# Patient Record
Sex: FEMALE | Race: WHITE | Employment: STUDENT | URBAN - METROPOLITAN AREA
[De-identification: names, ages, dates, MRNs, and addresses within clinical notes are randomized per-mention and may not be internally consistent; named-entity substitution may affect disease eponyms.]

---

## 2019-01-04 ENCOUNTER — HOSPITAL ENCOUNTER (EMERGENCY)
Age: 19
Discharge: HOME | End: 2019-01-04
Attending: STUDENT IN AN ORGANIZED HEALTH CARE EDUCATION/TRAINING PROGRAM
Payer: OTHER GOVERNMENT

## 2019-01-04 VITALS
TEMPERATURE: 98.6 F | HEIGHT: 64 IN | SYSTOLIC BLOOD PRESSURE: 125 MMHG | RESPIRATION RATE: 12 BRPM | OXYGEN SATURATION: 97 % | HEART RATE: 71 BPM | DIASTOLIC BLOOD PRESSURE: 73 MMHG | WEIGHT: 109 LBS | BODY MASS INDEX: 18.61 KG/M2

## 2019-01-04 DIAGNOSIS — H60.392 CHRONIC BACTERIAL OTITIS EXTERNA OF LEFT EAR: Primary | ICD-10-CM

## 2019-01-04 DIAGNOSIS — M79.5 FOREIGN BODY (FB) IN SOFT TISSUE: ICD-10-CM

## 2019-01-04 PROCEDURE — 74011000250 HC RX REV CODE- 250: Performed by: STUDENT IN AN ORGANIZED HEALTH CARE EDUCATION/TRAINING PROGRAM

## 2019-01-04 PROCEDURE — 99283 EMERGENCY DEPT VISIT LOW MDM: CPT

## 2019-01-04 RX ADMIN — Medication 3 ML: at 17:57

## 2019-01-04 NOTE — ED TRIAGE NOTES
Pt ambulatory to triage with family member. Pt got left cartilage pierced 1 week ago PTA. Pt visited doctor to evaluate; piercing is infected. Pt noticed pus from piercing and significant swelling. Pt reports pain 10/10. Pain has been noticeable for 3 days.

## 2019-01-04 NOTE — ED NOTES
Patient reports pain still present at site of piercing. Additional LET applied and recovered with Tegaderm. Patient tolerated well. Mother remains at bedside.

## 2019-01-05 NOTE — DISCHARGE INSTRUCTIONS
Patient Education        Infection From Body Piercings in Teens: Care Instructions  Your Care Instructions  An infected piercing can be serious. The area around your piercing may be painful, swollen, red, and hot. You may see red streaks or pus at the piercing site. You may have a fever. Or you may have swollen or tender lymph nodes. It's important to take good care of your infection at home so it doesn't get worse. Follow-up care is a key part of your treatment and safety. Be sure to make and go to all appointments, and call your doctor if you are having problems. It's also a good idea to know your test results and keep a list of the medicines you take. How can you care for yourself at home? · If your doctor prescribed antibiotics, take them as directed. Do not stop taking them just because you feel better. You need to take the full course of antibiotics. · Remove the jewelry unless your doctor says it's okay to keep it in. Soak the area in warm water for 20 minutes, 3 or 4 times a day. If it's too hard to soak the site (for example, if you had your belly button pierced), apply a warm, moist cloth instead. · If your doctor told you how to care for your infected piercing, follow your doctor's instructions. If you did not get instructions, follow this general advice:  ? Wash the area with clean water 2 times a day. Don't use hydrogen peroxide or alcohol, which can slow healing. ? You may cover the area with a thin layer of petroleum jelly, such as Vaseline, and a nonstick bandage. ? Apply more petroleum jelly and replace the bandage as needed. · Ask your doctor if you can take an over-the-counter pain medicine, such as acetaminophen (Tylenol), ibuprofen (Advil, Motrin), or naproxen (Aleve). Be safe with medicines. Read and follow all instructions on the label. When should you call for help?   Call your doctor now or seek immediate medical care if:    · You lose feeling in the area near the piercing, or it feels numb or tingly.     · The skin near the piercing turns pale or cool.     · The pierced area starts to bleed, and blood soaks through the bandage. Oozing small amounts of blood is normal.    Watch closely for changes in your health, and be sure to contact your doctor if:    · Your symptoms are getting worse. Where can you learn more? Go to http://rosemary-stacey.info/. Enter Y973 in the search box to learn more about \"Infection From Body Piercings in Teens: Care Instructions. \"  Current as of: November 20, 2017  Content Version: 11.8  © 7720-7965 [employer], [employer]. Care instructions adapted under license by [employer] (which disclaims liability or warranty for this information). If you have questions about a medical condition or this instruction, always ask your healthcare professional. [address] any warranty or liability for your use of this information.

## 2019-01-05 NOTE — ED NOTES
Bedside and Verbal shift change report given to John Sotelo RN (oncoming nurse) by Maisie Cogan, RN (offgoing nurse). Report included the following information SBAR, ED Summary and MAR.

## 2019-01-06 NOTE — ED PROVIDER NOTES
Patient is a 74-year-old female presenting to the emergency department for left ear pain. Patient had her cartilage pierced approximately one week ago had seen her primary care doctor a few days ago was told that it was infected she was placed on Keflex. Patient since then has started to experience worsening pain as well as drainage from her ear she describes the pain as 10 out of 10. The patient denies any fevers, chills, nuchal rigidity decreased hearing History reviewed. No pertinent past medical history. History reviewed. No pertinent surgical history. History reviewed. No pertinent family history. Social History Socioeconomic History  Marital status: SINGLE Spouse name: Not on file  Number of children: Not on file  Years of education: Not on file  Highest education level: Not on file Social Needs  Financial resource strain: Not on file  Food insecurity - worry: Not on file  Food insecurity - inability: Not on file  Transportation needs - medical: Not on file  Transportation needs - non-medical: Not on file Occupational History  Not on file Tobacco Use  Smoking status: Never Smoker  Smokeless tobacco: Never Used Substance and Sexual Activity  Alcohol use: No  
 Drug use: No  
 Sexual activity: No  
Other Topics Concern  Not on file Social History Narrative  Not on file ALLERGIES: Doxycycline Review of Systems HENT: Positive for ear pain. All other systems reviewed and are negative. Vitals:  
 01/04/19 1730 01/04/19 1744 01/04/19 1928 BP: 103/60  125/73 Pulse: 78  71 Resp: 16  12 Temp: 98.6 °F (37 °C) SpO2: 99% 99% 97% Weight: 49.4 kg (109 lb) Height: 5' 4\" (1.626 m) Physical Exam  
Constitutional: She is oriented to person, place, and time. She appears well-developed and well-nourished. No distress. HENT:  
Head: Normocephalic and atraumatic. Ears: Nose: Nose normal.  
Mouth/Throat: Oropharynx is clear and moist. No oropharyngeal exudate. Red=purulent drainage Blue=erythema/tendernss Eyes: Conjunctivae and EOM are normal. Pupils are equal, round, and reactive to light. Right eye exhibits no discharge. Left eye exhibits no discharge. No scleral icterus. Neck: Normal range of motion. Neck supple. No JVD present. No tracheal deviation present. No thyromegaly present. Cardiovascular: Exam reveals no gallop and no friction rub. No murmur heard. Pulmonary/Chest: Effort normal. No stridor. No respiratory distress. She has no wheezes. She has no rales. She exhibits no tenderness. Abdominal: She exhibits no distension and no mass. There is no tenderness. There is no rebound. Musculoskeletal: Normal range of motion. She exhibits no edema or tenderness. Lymphadenopathy:  
  She has no cervical adenopathy. Neurological: She is alert and oriented to person, place, and time. No cranial nerve deficit. Coordination normal.  
Skin: Skin is warm and dry. No rash noted. She is not diaphoretic. No erythema. No pallor. Psychiatric: She has a normal mood and affect. Her behavior is normal. Judgment and thought content normal.  
Nursing note and vitals reviewed. MDM Number of Diagnoses or Management Options Chronic bacterial otitis externa of left ear: Foreign body (FB) in soft tissue:  
Diagnosis management comments: A/P:  Piercing with cellulits. Removal of piercing clean and continue Abx. Foreign Body Removal 
Date/Time: 1/6/2019 2:48 PM 
Performed by: Tana Fisher MD 
Authorized by: Tana Fisher MD  
 
Consent:  
  Consent obtained:  Verbal 
  Consent given by:  Patient Risks discussed:  Bleeding, infection and pain Alternatives discussed:  Delayed treatment Location: Location:  Ear Ear location:  L ear Tendon involvement:  None Pre-procedure details:  
  Imaging:  None Neurovascular status: intact Anesthesia (see MAR for exact dosages): Anesthesia method:  Topical application Topical anesthetic:  LET Procedure type:  
  Procedure complexity:  Simple Procedure details:  
  Dissection of underlying tissues: no   
  Removal mechanism:  Hemostat Foreign bodies recovered:  1 Description:  Piercing with two studs Intact foreign body removal: yes Post-procedure details:  
  Neurovascular status: intact Confirmation:  No additional foreign bodies on visualization Skin closure:  None Dressing:  Antibiotic ointment Patient tolerance of procedure: Tolerated well, no immediate complications 2:49 PM 
The patient has been reevaluated. The patient is ready for discharge. The patient's signs, symptoms, diagnosis, and discharge instructions have been discussed and the patient/ family has conveyed their understanding. The patient is to follow up as recommended or return to the ED should their symptoms worsen. Plan has been discussed and the patient is in agreement. LABORATORY TESTS: 
No results found for this or any previous visit (from the past 12 hour(s)). IMAGING RESULTS: 
No orders to display No results found. MEDICATIONS GIVEN: 
Medications  
lidocaine/EPINEPHrine/tetracaine/methylcellulose (LET) topical gel gel 3 mL (3 mL Topical Given 1/4/19 4906) IMPRESSION: 
1. Chronic bacterial otitis externa of left ear 2. Foreign body (FB) in soft tissue PLAN: 
1. Discharge Medication List as of 1/4/2019  7:22 PM  
  
 
2. Follow-up Information Follow up With Specialties Details Why Contact Info Veronica Jones NP Nurse Practitioner  If symptoms worsen [address] [facility] [address] [facility] 
[phone] [facility] EMERGENCY DEPT Emergency Medicine  If symptoms worsen [place] [address] [address] [facility] 
[phone] Return to ED for new or worsening symptoms Maddie Martin MD